# Patient Record
Sex: FEMALE | Race: ASIAN | NOT HISPANIC OR LATINO | URBAN - METROPOLITAN AREA
[De-identification: names, ages, dates, MRNs, and addresses within clinical notes are randomized per-mention and may not be internally consistent; named-entity substitution may affect disease eponyms.]

---

## 2022-02-07 ENCOUNTER — EMERGENCY (EMERGENCY)
Facility: HOSPITAL | Age: 62
LOS: 1 days | Discharge: HOME | End: 2022-02-07
Attending: EMERGENCY MEDICINE | Admitting: EMERGENCY MEDICINE
Payer: COMMERCIAL

## 2022-02-07 VITALS
HEART RATE: 71 BPM | WEIGHT: 203.93 LBS | HEIGHT: 62 IN | TEMPERATURE: 99 F | DIASTOLIC BLOOD PRESSURE: 86 MMHG | OXYGEN SATURATION: 100 % | RESPIRATION RATE: 16 BRPM | SYSTOLIC BLOOD PRESSURE: 144 MMHG

## 2022-02-07 VITALS
TEMPERATURE: 99 F | DIASTOLIC BLOOD PRESSURE: 82 MMHG | SYSTOLIC BLOOD PRESSURE: 140 MMHG | RESPIRATION RATE: 18 BRPM | OXYGEN SATURATION: 99 % | HEART RATE: 70 BPM

## 2022-02-07 PROCEDURE — 72070 X-RAY EXAM THORAC SPINE 2VWS: CPT | Mod: 26

## 2022-02-07 PROCEDURE — 72070 X-RAY EXAM THORAC SPINE 2VWS: CPT

## 2022-02-07 PROCEDURE — 99284 EMERGENCY DEPT VISIT MOD MDM: CPT | Mod: 25

## 2022-02-07 PROCEDURE — 72100 X-RAY EXAM L-S SPINE 2/3 VWS: CPT

## 2022-02-07 PROCEDURE — 99284 EMERGENCY DEPT VISIT MOD MDM: CPT

## 2022-02-07 PROCEDURE — 72100 X-RAY EXAM L-S SPINE 2/3 VWS: CPT | Mod: 26

## 2022-02-07 RX ORDER — CYCLOBENZAPRINE HYDROCHLORIDE 10 MG/1
1 TABLET, FILM COATED ORAL
Qty: 14 | Refills: 0
Start: 2022-02-07 | End: 2022-02-13

## 2022-02-07 RX ORDER — LIDOCAINE 4 G/100G
1 CREAM TOPICAL ONCE
Refills: 0 | Status: COMPLETED | OUTPATIENT
Start: 2022-02-07 | End: 2022-02-07

## 2022-02-07 RX ADMIN — LIDOCAINE 1 PATCH: 4 CREAM TOPICAL at 11:43

## 2022-02-07 NOTE — ED PROVIDER NOTE - CLINICAL SUMMARY MEDICAL DECISION MAKING FREE TEXT BOX
presents today due to slip and fall. pt reports she was walking out of the house this morning in which she slipped on ice and landed on her back. plan includes Xray thoracic/lumbar spine r/o fx, lidocaine patch, re-assess

## 2022-02-07 NOTE — ED ADULT NURSE NOTE - NSIMPLEMENTINTERV_GEN_ALL_ED
Implemented All Fall Risk Interventions:  [place] to call system. Call bell, personal items and telephone within reach. Instruct patient to call for assistance. Room bathroom lighting operational. Non-slip footwear when patient is off stretcher. Physically safe environment: no spills, clutter or unnecessary equipment. Stretcher in lowest position, wheels locked, appropriate side rails in place. Provide visual cue, wrist band, yellow gown, etc. Monitor gait and stability. Monitor for mental status changes and reorient to person, place, and time. Review medications for side effects contributing to fall risk. Reinforce activity limits and safety measures with patient and family.

## 2022-02-07 NOTE — ED PROVIDER NOTE - PATIENT PORTAL LINK FT
You can access the FollowMyHealth Patient Portal offered by [facility] by registering at the following website: [email or web address]. By joining [email or web address] FollowMyHealth portal, you will also be able to view your health information using other applications (apps) compatible with our system.

## 2022-02-07 NOTE — ED ADULT NURSE NOTE - CHIEF COMPLAINT QUOTE
Patient is a 60yo female complaining of falling today resulting in back pain lower and mid back Patient denies blood thinning medication Patient denies loss of consciousness Patient denies hitting her head or neck tenderness

## 2022-02-07 NOTE — ED ADULT NURSE NOTE - OBJECTIVE STATEMENT
Patient A/Ox4 with a steady gait.  at bedside. C/o trip and fall outside house while carrying bags. Denies head strike, LOC or dizziness. C/o midline thoracic and lumbar back pain. Took Advil PTA. ROM WNL for all extremities. Call light in reach. Awaiting x rays.

## 2022-02-07 NOTE — ED PROVIDER NOTE - CARE PROVIDER_API CALL
Aleksey Torres (MD)  Orthopaedic Surgery  [address] [place] [address]  Phone: [phone]  Fax: [phone]  Follow Up Time: 1-3 Days

## 2022-02-07 NOTE — ED PROVIDER NOTE - NSFOLLOWUPINSTRUCTIONS_ED_ALL_ED_FT
1) Follow-up with Orthopedics, See referred doctor. Call today/next business day for close, prompt follow-up.  2) Return to Emergency room for any worsening or persistent pain, weakness, numbness, fever, color change to extremity, or any other concerning symptoms.  3) Take ibuprofen 600 mg every  6 hours as needed.   4) You can consider discussing with your doctor if physical therapy or further imaging as an MRI may be beneficial.     Acute Low Back Pain    WHAT YOU NEED TO KNOW:    What is acute low back pain? Acute low back pain is sudden discomfort that lasts up to 6 weeks and makes activity difficult.    What causes or increases my risk for acute low back pain? Conditions that affect the spine, joints, or muscles can cause back pain. These may include arthritis, spinal stenosis (narrowing of the spinal column), muscle tension, or breakdown of the spinal discs. The following increase your risk for back pain:  •Repeated bending, lifting, or twisting, or lifting heavy items      •Injury from a fall or accident      •Lack of regular physical activity      •Obesity or pregnancy      •Smoking      •Aging      •Driving, sitting, or standing for long periods      •Bad posture while sitting or standing      How is the cause of acute low back pain diagnosed? Your healthcare provider will ask about your medical history and examine you. He or she may ask when you last had low back pain and how it started. Show him or her where you feel the pain and what makes it better or worse. Tell your provider about the type of pain you have, how bad it is, and how long it lasts. Tell him or her if your pain worsens at night or when you lie on your back.    Pain Scale          How is acute low back pain treated? The goal of treatment is to relieve your pain and help you be able to do your regular activities. Most people with acute low back pain get better within 4 to 6 weeks. You may need any of the following:  •NSAIDs, such as ibuprofen, help decrease swelling, pain, and fever. This medicine is available with or without a doctor's order. NSAIDs can cause stomach bleeding or kidney problems in certain people. If you take blood thinner medicine, always ask your healthcare provider if NSAIDs are safe for you. Always read the medicine label and follow directions.      •Acetaminophen decreases pain and fever. It is available without a doctor's order. Ask how much to take and how often to take it. Follow directions. Read the labels of all other medicines you are using to see if they also contain acetaminophen, or ask your doctor or pharmacist. Acetaminophen can cause liver damage if not taken correctly. Do not use more than 4 grams (4,000 milligrams) total of acetaminophen in one day.       •Muscle relaxers decrease pain by relaxing the muscles in your lower spine.      •Prescription pain medicine may be given. Ask your healthcare provider how to take this medicine safely. Some prescription pain medicines contain acetaminophen. Do not take other medicines that contain acetaminophen without talking to your healthcare provider. Too much acetaminophen may cause liver damage. Prescription pain medicine may cause constipation. Ask your healthcare provider how to prevent or treat constipation.       What can I do to prevent low back pain?   •Use proper body mechanics. ?Bend at the hips and knees when you  objects. Do not bend from the waist. Use your leg muscles as you lift the load. Do not use your back. Keep the object close to your chest as you lift it. Try not to twist or lift anything above your waist.  How to Lift Items Safely           ?Change your position often when you stand for long periods of time. Rest one foot on a small box or footrest, and then switch to the other foot often.      ?Try not to sit for long periods of time. When you do, sit in a straight-backed chair with your feet flat on the floor. Never reach, pull, or push while you are sitting.      •Do exercises that strengthen your back muscles. Warm up before you exercise. Ask your healthcare provider for the best exercises for you.  Warm up and Cool Down            •Maintain a healthy weight. Ask your healthcare provider what a healthy weight is for you. Ask him or her to help you create a weight loss plan if you are overweight.      How can I take care of myself if I have acute low back pain?   •Stay active as much as you can without causing more pain. Bed rest could make your back pain worse. Start with some light exercises, such as walking. Avoid heavy lifting until your pain is gone. Ask for more information about the activities or exercises that are right for you.   FAMILY WALKING FOR EXERCISE           •Apply heat on your back for 20 to 30 minutes every 2 hours for as many days as directed. Heat helps decrease pain and muscle spasms. Alternate heat and ice.      •Apply ice on your back for 15 to 20 minutes every hour or as directed. Use an ice pack, or put crushed ice in a plastic bag. Cover it with a towel before you apply it to your skin. Ice helps prevent tissue damage and decreases swelling and pain.      When should I seek immediate care?   •You have severe pain.      •You have sudden stiffness and heaviness down both legs.      •You have numbness or weakness in one leg, or pain in both legs.      •You have numbness in your genital area or across your lower back.      •You cannot control your urine or bowel movements.      When should I call my doctor?   •You have a fever.      •You have pain at night or when you rest.      •Your pain does not get better with treatment.      •You have pain that worsens when you cough or sneeze.      •You suddenly feel something pop or snap in your back.      •You have questions or concerns about your condition or care.      CARE AGREEMENT:    You have the right to help plan your care. Learn about your health condition and how it may be treated. Discuss treatment options with your healthcare providers to decide what care you want to receive. You always have the right to refuse treatment.

## 2022-02-07 NOTE — ED PROVIDER NOTE - ATTENDING CONTRIBUTION TO CARE
60 yo f who slipped and fell onto ice landing on buttocks- she has pian in low mid back sp fall and so she came toe r for eval she has hx of back problems no radicular sx no weakness numbness no incontinence of urine or stool  no fevr no chills  bib  for eval  on eval  wd wn female nad  heent nc at   resp normal  back pt hs lo mid discomfort no guard no spasm  neg slr motor sensory normal  neuro normal  plan xray treat pain refer to spine for acute low back pain sp fall

## 2022-02-07 NOTE — ED ADULT TRIAGE NOTE - CHIEF COMPLAINT QUOTE
Patient is a 62yo female complaining of falling today resulting in back pain lower and mid back Patient denies blood thinning medication Patient denies loss of consciousness Patient denies hitting her head or neck tenderness

## 2022-02-07 NOTE — ED PROVIDER NOTE - PHYSICAL EXAMINATION
Constitutional: Awake, Alert, non-toxic. NAD. Well appearing, well nourished.   HEAD: Normocephalic, atraumatic.   EYES: PERRL, EOM intact, conjunctiva and sclera are clear bilaterally. No raccoon eyes.   ENT: No amador sign. No rhinorrhea, normal pharynx, patent, no tonsillar exudate or enlargement, mucous membranes pink/moist, no erythema, no drooling or stridor.   NECK: Supple, non-tender  BACK: (+) lumbar midline surgical scar (+) lumbar and thoracic midline TTP. No midline cervical spine, FROM. No ecchymosis or hematomas. no rib TTP.   CARDIOVASCULAR: Normal S1, S2; regular rate and rhythm.  RESPIRATORY: Normal respiratory effort; breath sounds CTAB, no wheezes, rhonchi, or rales. Speaking in full sentences. No accessory muscle use.   ABDOMEN: Soft; non-tender, non-distended  EXTREMITIES: Full passive and active ROM in all extremities; hips non-tender to palpation; distal pulses palpable and symmetric, no edema, no crepitus or step off  SKIN: Warm, dry; good skin turgor, no apparent lesions or rashes, no ecchymosis, brisk capillary refill.  NEURO: A&O x3. Sensory and motor functions are grossly intact. Speech is normal. Appearance and judgement seem appropriate for gender and age. No neurological deficits. Neurovascular sensation intact motor function 5/5 of upper and lower extremities

## 2022-02-07 NOTE — ED PROVIDER NOTE - OBJECTIVE STATEMENT
62 y/o female with PMHx HTN presents today due to slip and fall. pt reports she was walking out of the house this morning in which she slipped on ice and landed on her back. pt notes she had a laminectomy done 3 years ago with a doctor from [facility]. pt describes lower back pain as aching, non-radiating, and currently 8/10. pt took Advil this morning. pt denies chest pain, SOB, rib pain, prodromal symptoms, urine/bowel incontinence, numbness/weakness, or any other complaints.

## 2022-05-09 PROBLEM — I10 ESSENTIAL (PRIMARY) HYPERTENSION: Chronic | Status: ACTIVE | Noted: 2022-02-07

## 2022-05-10 ENCOUNTER — APPOINTMENT (OUTPATIENT)
Dept: ORTHOPEDIC SURGERY | Facility: CLINIC | Age: 62
End: 2022-05-10

## 2023-03-23 NOTE — ED PROVIDER NOTE - WR ORDER ID 1
Chonodrocutaneous Helical Advancement Flap Text: The defect edges were debeveled with a #15 scalpel blade.  Given the location of the defect and the proximity to free margins a chondrocutaneous helical advancement flap was deemed most appropriate.  Using a sterile surgical marker, the appropriate advancement flap was drawn incorporating the defect and placing the expected incisions within the relaxed skin tension lines where possible.    The area thus outlined was incised deep to adipose tissue with a #15 scalpel blade.  The skin margins were undermined to an appropriate distance in all directions utilizing iris scissors. [id number]

## 2023-09-20 NOTE — ED ADULT TRIAGE NOTE - HAVE YOU HAD A FIRST COVID-19 BOOSTER?
Constipation in Children   WHAT YOU NEED TO KNOW:   Constipation means your child has hard, dry bowel movements or goes longer than usual in between bowel movements. DISCHARGE INSTRUCTIONS:   Return to the emergency department if:   You see blood in your child's diaper or bowel movement. Your child's abdomen is swollen. Your child does not want to eat or drink. Your child has severe abdomen or rectal pain. Your child is vomiting. Call your child's doctor if:   Your child does not have regular bowel movements, even after treatment. It has been longer than usual between your child's bowel movements. Your child has bowel movements that are hard or painful to pass. Your child has an upset stomach. You have any questions or concerns about your child's condition or care. Medicines:   Medicine  such as a laxative may help relax and loosen your child's intestines to help him or her have a bowel movement. Your child's healthcare provider can tell you the best laxative for your child. Use a laxative made specifically for your child's age and symptoms. Adult laxatives may be too strong for your child. Your provider may recommend your child only use laxatives for a short time. Long-term use can damage your child's bowel function over time. Give your child's medicine as directed. Contact your child's healthcare provider if you think the medicine is not working as expected. Tell the provider if your child is allergic to any medicine. Keep a current list of the medicines, vitamins, and herbs your child takes. Include the amounts, and when, how, and why they are taken. Bring the list or the medicines in their containers to follow-up visits. Carry your child's medicine list with you in case of an emergency. Relieve your child's constipation:  Medicines can help your child have a bowel movement more easily.  Medicines may increase moisture in your child's bowel movement or increase the motion of his or her intestines. A suppository  may be used to help soften your child's bowel movements. This may make them easier to pass. A suppository is guided into your child's rectum through his or her anus. An enema  is liquid medicine used to clear bowel movement from your child's rectum. The medicine is put into your child's rectum through his or her anus. Help manage your child's constipation:   Give your child liquids as directed. Liquids help keep your child's bowel movements soft. Ask how much liquid to give your child each day and which liquids are best for him or her. Your child may need to drink more liquids than usual. Limit sports drinks, soda, and other drinks that contain caffeine. Feed your child a variety of high-fiber foods. This may help decrease constipation by adding bulk and softness to your child's bowel movements. High-fiber foods include fruit, vegetables, whole-grain breads and cereals, and beans. Depending on your child's age, his or her provider may also recommend a fiber supplement. Help your child be active. Regular physical activity can help stimulate your child's intestines. Ask about the best exercise plan for your child. Set up a regular time each day for your child to have a bowel movement. This may help train your child's body to have regular bowel movements. Help him or her to sit on the toilet for at least 10 minutes. Do this even if he or she does not have a bowel movement. Do not pressure your young child to have a bowel movement. Give your child a warm bath. A warm bath at least 1 time each day can help relax his or her rectum. This can make it easier for him or her to have a bowel movement. Follow up with your child's doctor as directed:  Write down your questions so you remember to ask them during your child's visits.   © Copyright [employer] 2023 Information is for End User's use only and may not be sold, redistributed or otherwise used for commercial purposes. The above information is an  only. It is not intended as medical advice for individual conditions or treatments. Talk to your doctor, nurse or pharmacist before following any medical regimen to see if it is safe and effective for you. Yes

## 2024-07-18 ENCOUNTER — APPOINTMENT (OUTPATIENT)
Dept: MRI IMAGING | Facility: CLINIC | Age: 64
End: 2024-07-18
Payer: COMMERCIAL

## 2024-07-18 ENCOUNTER — OUTPATIENT (OUTPATIENT)
Dept: OUTPATIENT SERVICES | Facility: HOSPITAL | Age: 64
LOS: 1 days | End: 2024-07-18
Payer: COMMERCIAL

## 2024-07-18 DIAGNOSIS — Z00.8 ENCOUNTER FOR OTHER GENERAL EXAMINATION: ICD-10-CM

## 2024-07-18 PROCEDURE — 70551 MRI BRAIN STEM W/O DYE: CPT

## 2024-07-18 PROCEDURE — 70551 MRI BRAIN STEM W/O DYE: CPT | Mod: 26

## 2024-08-24 ENCOUNTER — OFFICE (OUTPATIENT)
Dept: URBAN - METROPOLITAN AREA CLINIC 27 | Facility: CLINIC | Age: 64
Setting detail: OPHTHALMOLOGY
End: 2024-08-24
Payer: COMMERCIAL

## 2024-08-24 DIAGNOSIS — H16.223: ICD-10-CM

## 2024-08-24 DIAGNOSIS — H25.13: ICD-10-CM

## 2024-08-24 DIAGNOSIS — H40.013: ICD-10-CM

## 2024-08-24 DIAGNOSIS — H52.4: ICD-10-CM

## 2024-08-24 PROBLEM — H18.513 ENDOTHELIAL CORNEAL DYSTROPHY; BOTH EYES: Status: ACTIVE | Noted: 2024-08-24

## 2024-08-24 PROBLEM — G51.0 BELLS PALSY: Status: ACTIVE | Noted: 2024-08-24

## 2024-08-24 PROBLEM — H52.7 REFRACTIVE ERROR ; BOTH EYES: Status: ACTIVE | Noted: 2024-08-24

## 2024-08-24 PROCEDURE — 92250 FUNDUS PHOTOGRAPHY W/I&R: CPT | Performed by: OPTOMETRIST

## 2024-08-24 PROCEDURE — 76514 ECHO EXAM OF EYE THICKNESS: CPT | Performed by: OPTOMETRIST

## 2024-08-24 PROCEDURE — 92004 COMPRE OPH EXAM NEW PT 1/>: CPT | Performed by: OPTOMETRIST

## 2024-08-24 PROCEDURE — 92015 DETERMINE REFRACTIVE STATE: CPT | Performed by: OPTOMETRIST

## 2024-08-24 ASSESSMENT — CONFRONTATIONAL VISUAL FIELD TEST (CVF)
OD_FINDINGS: FULL
OS_FINDINGS: FULL

## 2024-08-26 ENCOUNTER — APPOINTMENT (OUTPATIENT)
Dept: ORTHOPEDIC SURGERY | Facility: CLINIC | Age: 64
End: 2024-08-26
Payer: COMMERCIAL

## 2024-08-26 VITALS — WEIGHT: 180 LBS | BODY MASS INDEX: 33.99 KG/M2 | HEIGHT: 61 IN

## 2024-08-26 DIAGNOSIS — Z78.9 OTHER SPECIFIED HEALTH STATUS: ICD-10-CM

## 2024-08-26 DIAGNOSIS — M17.11 UNILATERAL PRIMARY OSTEOARTHRITIS, RIGHT KNEE: ICD-10-CM

## 2024-08-26 PROCEDURE — 99203 OFFICE O/P NEW LOW 30 MIN: CPT | Mod: 25

## 2024-08-26 PROCEDURE — 73562 X-RAY EXAM OF KNEE 3: CPT | Mod: RT

## 2024-08-26 PROCEDURE — 20610 DRAIN/INJ JOINT/BURSA W/O US: CPT | Mod: RT

## 2024-08-26 NOTE — PHYSICAL EXAM
[Normal Mood and Affect] : normal mood and affect [Able to Communicate] : able to communicate [Well Developed] : well developed [Well Nourished] : well nourished [NL (0)] : extension 0 degrees [5___] : hamstring 5[unfilled]/5 [5mm] : openinmm [Right] : right knee [AP] : anteroposterior [Lateral] : lateral [Proctor] : skyline [] : negative Lachmann [FreeTextEntry9] : Moderate to severe medial narrowing.  [TWNoteComboBox7] : flexion 125 degrees

## 2024-08-26 NOTE — HISTORY OF PRESENT ILLNESS
[8] : 8 [Localized] : localized [Full time] : Work status: full time [de-identified] : 8/26/24  Initial visit for this 63 year old female complaining of spontaneous onset of rt knee pain x last 2 weeks duration. No hx of trauma. Constant and daily. Has a slight limp. Tried Advil prn with some relief.  PMH: Hx of similar rt knee pain in 2014 and had cortisone injection which helped.  IMPRESSION:  2015 RIGHT KNEE 1. Advanced osteoarthritis most significant at the medial compartment. 2. Full-thickness radial tear at the junction of the posterior horn and body of the medial meniscus. Degenerative tearing of the body of the medial meniscus with extrusion. 3. Mucoid degeneration of the ACL. 4. Mild to moderate complex prepatellar bursitis. Trace joint effusion with synovitis. 5. Thickening and increased signal at the origin of the LCL which could reflect chronic sprain/scarring. Mild popliteus tendinosis. [] : no [FreeTextEntry1] : RT knee  [FreeTextEntry5] : 63tyop female presenting with RT knee pain. No known injury or fall. Experiencing occasional locking and frequent shooting pain. Has had CSI 5-6 years ago with relief. No recent treatment.  [de-identified] :

## 2024-08-26 NOTE — PLAN
[TextEntry] : The natural progression of osteoarthritis was explained to the patient.  We discussed the possible treatment options from conservative to operative.  These included NSAIDs, Glucosamine and Chondroitin sulfate, and physical therapy as well different types of injections.  We also discussed that at some point they may progress to need a TKA.  Information and pamphlets were given.   Cortisone injection given today. Medication was injected into the above treated area. After verbal consent using sterile preparation and technique. The risks, benefits, and alternatives to cortisone injection were explained in full to the patient. Risks outlined include but are not limited to infection, sepsis, bleeding, scarring, skin discoloration, temporary increase in pain, syncopal episode, failure to resolve symptoms, allergic reaction, symptom recurrence, and elevation of blood sugar in diabetics. Patient understood the risks. All questions were answered. After discussion of options, patient requested an injection. Oral informed consent was obtained and sterile prep was done of the injection site. Sterile technique was utilized for the procedure including the preparation of the solutions used for the injection. Patient tolerated the procedure well. Advised to ice the injection site this evening. Prep with Betadine locally to site. Sterile technique used. Patient tolerated procedure well. Post Procedure Instructions: Patient was advised to call if redness, pain, or fever occur and apply ice for 15 min. out of every hour for the next 12-24 hours as tolerated.